# Patient Record
Sex: MALE | Employment: STUDENT | URBAN - METROPOLITAN AREA
[De-identification: names, ages, dates, MRNs, and addresses within clinical notes are randomized per-mention and may not be internally consistent; named-entity substitution may affect disease eponyms.]

---
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
## 2018-02-23 ENCOUNTER — APPOINTMENT (OUTPATIENT)
Dept: GENERAL RADIOLOGY | Age: 18
End: 2018-02-23
Attending: NURSE PRACTITIONER
Payer: COMMERCIAL

## 2018-02-23 ENCOUNTER — TELEPHONE (OUTPATIENT)
Dept: FAMILY MEDICINE CLINIC | Facility: CLINIC | Age: 18
End: 2018-02-23

## 2018-02-23 ENCOUNTER — HOSPITAL ENCOUNTER (OUTPATIENT)
Age: 18
Discharge: HOME | End: 2018-02-23
Payer: COMMERCIAL

## 2018-02-23 VITALS
DIASTOLIC BLOOD PRESSURE: 80 MMHG | HEART RATE: 98 BPM | OXYGEN SATURATION: 98 % | RESPIRATION RATE: 20 BRPM | TEMPERATURE: 99 F | SYSTOLIC BLOOD PRESSURE: 122 MMHG

## 2018-02-23 DIAGNOSIS — J20.9 ACUTE BRONCHITIS, UNSPECIFIED ORGANISM: ICD-10-CM

## 2018-02-23 DIAGNOSIS — J11.1 INFLUENZA-LIKE ILLNESS: Primary | ICD-10-CM

## 2018-02-23 PROCEDURE — 71046 X-RAY EXAM CHEST 2 VIEWS: CPT | Performed by: NURSE PRACTITIONER

## 2018-02-23 PROCEDURE — 99214 OFFICE O/P EST MOD 30 MIN: CPT

## 2018-02-23 PROCEDURE — 99213 OFFICE O/P EST LOW 20 MIN: CPT

## 2018-02-23 RX ORDER — ALBUTEROL SULFATE 90 UG/1
1 AEROSOL, METERED RESPIRATORY (INHALATION) EVERY 4 HOURS PRN
Qty: 1 INHALER | Refills: 0 | Status: SHIPPED | OUTPATIENT
Start: 2018-02-23 | End: 2019-08-23

## 2018-02-23 RX ORDER — PREDNISONE 20 MG/1
40 TABLET ORAL DAILY
Qty: 10 TABLET | Refills: 0 | Status: SHIPPED | OUTPATIENT
Start: 2018-02-23 | End: 2018-02-28

## 2018-02-23 RX ORDER — OSELTAMIVIR PHOSPHATE 75 MG/1
75 CAPSULE ORAL 2 TIMES DAILY
Qty: 10 CAPSULE | Refills: 0 | Status: SHIPPED | OUTPATIENT
Start: 2018-02-23 | End: 2018-02-28

## 2018-02-23 NOTE — TELEPHONE ENCOUNTER
Patients mom called. Pt has flu like symptoms complaining of chest hurting and shortness of breath. Appt is jayshree at 1130 if ok. Please advise mother of same.

## 2018-02-23 NOTE — ED PROVIDER NOTES
Patient Seen in: [address] [facility]    History   Patient presents with:  Cough/URI  Fever (infectious)    Stated Complaint: cough/fever/congestion    66-year-old male presents today with complaints of body aches, fever chills congestion and and time. He appears well-developed and well-nourished. He is cooperative. He appears ill. No distress. HENT:   Head: Normocephalic.    Right Ear: Hearing and tympanic membrane normal.   Left Ear: Hearing and tympanic membrane normal.   Nose: Mucosal marta and history of wheezing. Follow primary MD in 5-7 days symptoms not improved. Encouraged to push fluids and rest.  May use over-the-counter ibuprofen Tylenol for pain fever and antihistamine for congestion.           Disposition and Plan     Clinical Impr

## 2019-08-23 ENCOUNTER — HOSPITAL ENCOUNTER (EMERGENCY)
Age: 19
Discharge: HOME | End: 2019-08-23
Attending: EMERGENCY MEDICINE
Payer: COMMERCIAL

## 2019-08-23 VITALS
TEMPERATURE: 98 F | RESPIRATION RATE: 16 BRPM | OXYGEN SATURATION: 100 % | WEIGHT: 250 LBS | BODY MASS INDEX: 33.13 KG/M2 | HEIGHT: 73 IN | SYSTOLIC BLOOD PRESSURE: 125 MMHG | DIASTOLIC BLOOD PRESSURE: 74 MMHG | HEART RATE: 86 BPM

## 2019-08-23 DIAGNOSIS — L05.01 PILONIDAL ABSCESS: Primary | ICD-10-CM

## 2019-08-23 PROCEDURE — 99283 EMERGENCY DEPT VISIT LOW MDM: CPT

## 2019-08-23 RX ORDER — CLINDAMYCIN HYDROCHLORIDE 300 MG/1
300 CAPSULE ORAL 3 TIMES DAILY
Qty: 21 CAPSULE | Refills: 0 | Status: SHIPPED | OUTPATIENT
Start: 2019-08-23 | End: 2019-08-30

## 2019-08-23 NOTE — ED PROVIDER NOTES
[facility]: [facility] [place]    History   Patient presents with:  Abscess (integumentary)    Stated Complaint: ABSCESS    HPI    Here with his mom concerned about potential pilonidal abscess. Brother had the same.   Has had Abdominal: Soft. Bowel sounds are normal.   Neurological: Pt is alert and oriented to person, place, and time. Pt has normal reflexes. Skin: Skin is warm and dry.       Just above the gluteal cleft there is an area of induration with several areas that

## 2022-06-28 ENCOUNTER — OFFICE VISIT (OUTPATIENT)
Dept: FAMILY MEDICINE CLINIC | Facility: CLINIC | Age: 22
End: 2022-06-28
Payer: COMMERCIAL

## 2022-06-28 VITALS
BODY MASS INDEX: 32.77 KG/M2 | HEART RATE: 96 BPM | RESPIRATION RATE: 16 BRPM | SYSTOLIC BLOOD PRESSURE: 122 MMHG | DIASTOLIC BLOOD PRESSURE: 60 MMHG | HEIGHT: 73 IN | OXYGEN SATURATION: 98 % | TEMPERATURE: 98 F | WEIGHT: 247.25 LBS

## 2022-06-28 DIAGNOSIS — R10.11 RUQ PAIN: ICD-10-CM

## 2022-06-28 DIAGNOSIS — Z00.00 ANNUAL PHYSICAL EXAM: Primary | ICD-10-CM

## 2022-06-28 DIAGNOSIS — Z13.31 NEGATIVE DEPRESSION SCREENING: ICD-10-CM

## 2022-06-28 PROCEDURE — 3074F SYST BP LT 130 MM HG: CPT | Performed by: FAMILY MEDICINE

## 2022-06-28 PROCEDURE — 3008F BODY MASS INDEX DOCD: CPT | Performed by: FAMILY MEDICINE

## 2022-06-28 PROCEDURE — 3078F DIAST BP <80 MM HG: CPT | Performed by: FAMILY MEDICINE

## 2022-06-28 PROCEDURE — 99385 PREV VISIT NEW AGE 18-39: CPT | Performed by: FAMILY MEDICINE

## 2022-06-28 PROCEDURE — 99213 OFFICE O/P EST LOW 20 MIN: CPT | Performed by: FAMILY MEDICINE

## 2022-06-29 LAB
ABSOLUTE BASOPHILS: 87 CELLS/UL (ref 0–200)
ABSOLUTE EOSINOPHILS: 78 CELLS/UL (ref 15–500)
ABSOLUTE LYMPHOCYTES: 1436 CELLS/UL (ref 850–3900)
ABSOLUTE MONOCYTES: 757 CELLS/UL (ref 200–950)
ABSOLUTE NEUTROPHILS: 7343 CELLS/UL (ref 1500–7800)
ALBUMIN/GLOBULIN RATIO: 2 (CALC) (ref 1–2.5)
ALBUMIN: 5.1 G/DL (ref 3.6–5.1)
ALKALINE PHOSPHATASE: 72 U/L (ref 36–130)
ALT: 15 U/L (ref 9–46)
AST: 15 U/L (ref 10–40)
BASOPHILS: 0.9 %
BILIRUBIN, TOTAL: 0.5 MG/DL (ref 0.2–1.2)
BUN: 7 MG/DL (ref 7–25)
CALCIUM: 10.2 MG/DL (ref 8.6–10.3)
CARBON DIOXIDE: 28 MMOL/L (ref 20–32)
CHLORIDE: 102 MMOL/L (ref 98–110)
CHOL/HDLC RATIO: 2.4 (CALC)
CHOLESTEROL, TOTAL: 153 MG/DL
CREATININE: 1.01 MG/DL (ref 0.6–1.35)
EGFR IF AFRICN AM: 121 ML/MIN/1.73M2
EGFR IF NONAFRICN AM: 104 ML/MIN/1.73M2
EOSINOPHILS: 0.8 %
GLOBULIN: 2.6 G/DL (CALC) (ref 1.9–3.7)
GLUCOSE: 84 MG/DL (ref 65–139)
HDL CHOLESTEROL: 65 MG/DL
HEMATOCRIT: 46.7 % (ref 38.5–50)
HEMOGLOBIN: 16.1 G/DL (ref 13.2–17.1)
LDL-CHOLESTEROL: 75 MG/DL (CALC)
LYMPHOCYTES: 14.8 %
MCH: 29.7 PG (ref 27–33)
MCHC: 34.5 G/DL (ref 32–36)
MCV: 86.2 FL (ref 80–100)
MONOCYTES: 7.8 %
MPV: 11.7 FL (ref 7.5–12.5)
NEUTROPHILS: 75.7 %
NON-HDL CHOLESTEROL: 88 MG/DL (CALC)
PLATELET COUNT: 227 THOUSAND/UL (ref 140–400)
POTASSIUM: 4 MMOL/L (ref 3.5–5.3)
PROTEIN, TOTAL: 7.7 G/DL (ref 6.1–8.1)
RDW: 12.7 % (ref 11–15)
RED BLOOD CELL COUNT: 5.42 MILLION/UL (ref 4.2–5.8)
SODIUM: 139 MMOL/L (ref 135–146)
TRIGLYCERIDES: 53 MG/DL
TSH W/REFLEX TO FT4: 2.4 MIU/L (ref 0.4–4.5)
WHITE BLOOD CELL COUNT: 9.7 THOUSAND/UL (ref 3.8–10.8)

## 2022-06-30 ENCOUNTER — HOSPITAL ENCOUNTER (EMERGENCY)
Facility: HOSPITAL | Age: 22
Discharge: LEFT BEFORE DISCHARGE | End: 2022-06-30
Payer: COMMERCIAL

## 2022-07-02 ENCOUNTER — PATIENT MESSAGE (OUTPATIENT)
Dept: FAMILY MEDICINE CLINIC | Facility: CLINIC | Age: 22
End: 2022-07-02

## 2022-07-02 DIAGNOSIS — R10.11 RUQ PAIN: Primary | ICD-10-CM

## 2022-07-02 DIAGNOSIS — K80.00 CALCULUS OF GALLBLADDER WITH ACUTE CHOLECYSTITIS WITHOUT OBSTRUCTION: ICD-10-CM

## 2023-01-28 ENCOUNTER — APPOINTMENT (OUTPATIENT)
Dept: GENERAL RADIOLOGY | Age: 23
End: 2023-01-28
Attending: NURSE PRACTITIONER
Payer: COMMERCIAL

## 2023-01-28 ENCOUNTER — HOSPITAL ENCOUNTER (OUTPATIENT)
Age: 23
Discharge: HOME | End: 2023-01-28
Payer: COMMERCIAL

## 2023-01-28 VITALS
BODY MASS INDEX: 33.13 KG/M2 | DIASTOLIC BLOOD PRESSURE: 75 MMHG | SYSTOLIC BLOOD PRESSURE: 134 MMHG | HEART RATE: 84 BPM | RESPIRATION RATE: 18 BRPM | OXYGEN SATURATION: 97 % | TEMPERATURE: 98 F | WEIGHT: 250 LBS | HEIGHT: 73 IN

## 2023-01-28 DIAGNOSIS — R05.1 ACUTE COUGH: Primary | ICD-10-CM

## 2023-01-28 PROCEDURE — 99203 OFFICE O/P NEW LOW 30 MIN: CPT | Performed by: NURSE PRACTITIONER

## 2023-01-28 PROCEDURE — 71046 X-RAY EXAM CHEST 2 VIEWS: CPT | Performed by: NURSE PRACTITIONER

## 2023-01-28 NOTE — ED INITIAL ASSESSMENT (HPI)
Pt with c/o cough for the past week. Taking mucinex and ibuprofen.   Pt noticed small amount of blood when he coughed this morning

## 2023-05-04 ENCOUNTER — APPOINTMENT (OUTPATIENT)
Dept: CT IMAGING | Age: 23
End: 2023-05-04
Attending: EMERGENCY MEDICINE
Payer: COMMERCIAL

## 2023-05-04 ENCOUNTER — HOSPITAL ENCOUNTER (EMERGENCY)
Age: 23
Discharge: HOME | End: 2023-05-05
Attending: EMERGENCY MEDICINE
Payer: COMMERCIAL

## 2023-05-04 ENCOUNTER — OFFICE VISIT (OUTPATIENT)
Dept: FAMILY MEDICINE CLINIC | Facility: CLINIC | Age: 23
End: 2023-05-04

## 2023-05-04 ENCOUNTER — APPOINTMENT (OUTPATIENT)
Dept: GENERAL RADIOLOGY | Age: 23
End: 2023-05-04
Payer: COMMERCIAL

## 2023-05-04 VITALS
RESPIRATION RATE: 18 BRPM | TEMPERATURE: 98 F | SYSTOLIC BLOOD PRESSURE: 118 MMHG | BODY MASS INDEX: 31.86 KG/M2 | HEART RATE: 76 BPM | OXYGEN SATURATION: 95 % | DIASTOLIC BLOOD PRESSURE: 78 MMHG | HEIGHT: 73 IN | WEIGHT: 240.38 LBS

## 2023-05-04 DIAGNOSIS — H66.92 ACUTE INFECTION OF LEFT EAR: ICD-10-CM

## 2023-05-04 DIAGNOSIS — R59.1 LYMPHADENOPATHY: Primary | ICD-10-CM

## 2023-05-04 DIAGNOSIS — R22.1 LUMP IN NECK: ICD-10-CM

## 2023-05-04 DIAGNOSIS — Z12.83 SKIN CANCER SCREENING: Primary | ICD-10-CM

## 2023-05-04 LAB
ALBUMIN SERPL-MCNC: 4.4 G/DL (ref 3.4–5)
ALBUMIN/GLOB SERPL: 1.3 {RATIO} (ref 1–2)
ALP LIVER SERPL-CCNC: 82 U/L
ALT SERPL-CCNC: 29 U/L
ANION GAP SERPL CALC-SCNC: 5 MMOL/L (ref 0–18)
AST SERPL-CCNC: 16 U/L (ref 15–37)
BASOPHILS # BLD AUTO: 0.11 X10(3) UL (ref 0–0.2)
BASOPHILS NFR BLD AUTO: 0.8 %
BILIRUB SERPL-MCNC: 0.4 MG/DL (ref 0.1–2)
BUN BLD-MCNC: 12 MG/DL (ref 7–18)
CALCIUM BLD-MCNC: 9.7 MG/DL (ref 8.5–10.1)
CHLORIDE SERPL-SCNC: 110 MMOL/L (ref 98–112)
CO2 SERPL-SCNC: 24 MMOL/L (ref 21–32)
CREAT BLD-MCNC: 1.09 MG/DL
EOSINOPHIL # BLD AUTO: 0.17 X10(3) UL (ref 0–0.7)
EOSINOPHIL NFR BLD AUTO: 1.3 %
ERYTHROCYTE [DISTWIDTH] IN BLOOD BY AUTOMATED COUNT: 12.4 %
GFR SERPLBLD BASED ON 1.73 SQ M-ARVRAT: 97 ML/MIN/1.73M2 (ref 60–?)
GLOBULIN PLAS-MCNC: 3.5 G/DL (ref 2.8–4.4)
GLUCOSE BLD-MCNC: 85 MG/DL (ref 70–99)
HCT VFR BLD AUTO: 47.8 %
HGB BLD-MCNC: 16.5 G/DL
IMM GRANULOCYTES # BLD AUTO: 0.09 X10(3) UL (ref 0–1)
IMM GRANULOCYTES NFR BLD: 0.7 %
LYMPHOCYTES # BLD AUTO: 2.09 X10(3) UL (ref 1–4)
LYMPHOCYTES NFR BLD AUTO: 15.5 %
MCH RBC QN AUTO: 30.1 PG (ref 26–34)
MCHC RBC AUTO-ENTMCNC: 34.5 G/DL (ref 31–37)
MCV RBC AUTO: 87.1 FL
MONOCYTES # BLD AUTO: 1.04 X10(3) UL (ref 0.1–1)
MONOCYTES NFR BLD AUTO: 7.7 %
NEUTROPHILS # BLD AUTO: 10.02 X10 (3) UL (ref 1.5–7.7)
NEUTROPHILS # BLD AUTO: 10.02 X10(3) UL (ref 1.5–7.7)
NEUTROPHILS NFR BLD AUTO: 74 %
OSMOLALITY SERPL CALC.SUM OF ELEC: 287 MOSM/KG (ref 275–295)
PLATELET # BLD AUTO: 246 10(3)UL (ref 150–450)
POTASSIUM SERPL-SCNC: 3.6 MMOL/L (ref 3.5–5.1)
PROT SERPL-MCNC: 7.9 G/DL (ref 6.4–8.2)
RBC # BLD AUTO: 5.49 X10(6)UL
SODIUM SERPL-SCNC: 139 MMOL/L (ref 136–145)
TROPONIN I HIGH SENSITIVITY: 3 NG/L
WBC # BLD AUTO: 13.5 X10(3) UL (ref 4–11)

## 2023-05-04 PROCEDURE — 3074F SYST BP LT 130 MM HG: CPT | Performed by: PHYSICIAN ASSISTANT

## 2023-05-04 PROCEDURE — 99203 OFFICE O/P NEW LOW 30 MIN: CPT | Performed by: PHYSICIAN ASSISTANT

## 2023-05-04 PROCEDURE — 99285 EMERGENCY DEPT VISIT HI MDM: CPT

## 2023-05-04 PROCEDURE — 99284 EMERGENCY DEPT VISIT MOD MDM: CPT

## 2023-05-04 PROCEDURE — 85025 COMPLETE CBC W/AUTO DIFF WBC: CPT

## 2023-05-04 PROCEDURE — 3008F BODY MASS INDEX DOCD: CPT | Performed by: PHYSICIAN ASSISTANT

## 2023-05-04 PROCEDURE — 36415 COLL VENOUS BLD VENIPUNCTURE: CPT

## 2023-05-04 PROCEDURE — 93005 ELECTROCARDIOGRAM TRACING: CPT

## 2023-05-04 PROCEDURE — 80053 COMPREHEN METABOLIC PANEL: CPT

## 2023-05-04 PROCEDURE — 80053 COMPREHEN METABOLIC PANEL: CPT | Performed by: EMERGENCY MEDICINE

## 2023-05-04 PROCEDURE — 93010 ELECTROCARDIOGRAM REPORT: CPT

## 2023-05-04 PROCEDURE — 70491 CT SOFT TISSUE NECK W/DYE: CPT | Performed by: EMERGENCY MEDICINE

## 2023-05-04 PROCEDURE — 84484 ASSAY OF TROPONIN QUANT: CPT | Performed by: EMERGENCY MEDICINE

## 2023-05-04 PROCEDURE — 84484 ASSAY OF TROPONIN QUANT: CPT

## 2023-05-04 PROCEDURE — 85025 COMPLETE CBC W/AUTO DIFF WBC: CPT | Performed by: EMERGENCY MEDICINE

## 2023-05-04 PROCEDURE — 71045 X-RAY EXAM CHEST 1 VIEW: CPT | Performed by: EMERGENCY MEDICINE

## 2023-05-04 PROCEDURE — 3078F DIAST BP <80 MM HG: CPT | Performed by: PHYSICIAN ASSISTANT

## 2023-05-04 RX ORDER — LOSARTAN POTASSIUM 25 MG/1
25 TABLET ORAL DAILY
COMMUNITY
End: 2023-05-04

## 2023-05-04 RX ORDER — ASPIRIN 81 MG/1
81 TABLET ORAL DAILY
COMMUNITY
End: 2023-05-04

## 2023-05-04 RX ORDER — AZITHROMYCIN 250 MG/1
TABLET, FILM COATED ORAL
Qty: 6 TABLET | Refills: 0 | Status: SHIPPED | OUTPATIENT
Start: 2023-05-04 | End: 2023-05-09

## 2023-05-04 NOTE — PROGRESS NOTES
Subjective:   Patient ID: Waneta Favre is a 23year old male. HPI   Patient presents to discuss a couple concerns:    He has a lump in the left neck that he found yesterday  It is extremely painful  The pain is constant but the severity is fluctuating  Not very tender if he presses the area or if he swallows  Feels like something is caught in the throat  No fever, chills but he feels hot  No constitutional symptoms  No ear pain, congestion or URI symptoms otherwise  Has had enlarged lymph nodes before but never felt like this, was never this painful    Also concerned about a few skin lesions he has  One is on the left upper arm and appeared after a bad sunburn  Also has one on either leg, the lesion on the left leg is hyperpigmented and raised  Mom has h/o skin cancer  Open to dermatology evaluation    History/Other:   Review of Systems   Constitutional: Negative for chills, fatigue and fever. HENT: Negative for congestion, ear pain, rhinorrhea and sore throat. Eyes: Negative for visual disturbance. Respiratory: Negative for cough, shortness of breath and wheezing. Cardiovascular: Negative for chest pain, palpitations and leg swelling. Gastrointestinal: Negative for abdominal pain, diarrhea, nausea and vomiting. Genitourinary: Negative for dysuria, frequency and hematuria. Musculoskeletal: Negative for arthralgias, gait problem and myalgias. Skin: Negative for color change, rash and wound. Mass left neck - painful  Multiple atypical nevi and other skin lesions   Neurological: Negative for weakness, light-headedness and headaches. Hematological: Positive for adenopathy (left neck). Psychiatric/Behavioral: Negative for dysphoric mood. The patient is not nervous/anxious. No current outpatient medications on file. Allergies:  Augmentin [Amoxicil*        Comment:Hives    Objective:   Physical Exam  Vitals and nursing note reviewed.    Constitutional:       General: He is not in acute distress. Appearance: Normal appearance. He is well-developed. HENT:      Head: Normocephalic and atraumatic. Right Ear: Tympanic membrane and external ear normal.      Left Ear: External ear normal. Tympanic membrane is erythematous. Nose: Mucosal edema and rhinorrhea present. Right Turbinates: Pale. Left Turbinates: Pale. Mouth/Throat:      Mouth: Mucous membranes are moist.      Pharynx: Posterior oropharyngeal erythema present. Comments: Post nasal drip  Eyes:      Conjunctiva/sclera: Conjunctivae normal.      Pupils: Pupils are equal, round, and reactive to light. Neck:      Thyroid: Thyroid mass (left anterior neck fullness/swelling) present. Cardiovascular:      Rate and Rhythm: Normal rate and regular rhythm. Heart sounds: Normal heart sounds. Pulmonary:      Effort: Pulmonary effort is normal.      Breath sounds: Normal breath sounds. No wheezing or rales. Musculoskeletal:      Cervical back: Normal range of motion and neck supple. Lymphadenopathy:      Cervical: Cervical adenopathy present. Skin:     General: Skin is warm. Findings: Lesion (scattered flat erythematous macular lesions with smudged borders. Few hyperpigmented raised nevi. ) present. Neurological:      Mental Status: He is alert. Assessment & Plan:   1. Skin cancer screening  Mom has h/o skin cancer and patient has multiple atypical nevi and scattered erythematous macular skin lesions. Referral to derm for head to toe exam.   - DERM - INTERNAL    2. Lump in neck  Unclear etiology. Given the acute ear infection this could be a reactive lymph node, however does not have the texture nor palpates like a lymph node. The mass is soft and larger than a normal lymph node. thyroid feels normal. Check ultrasound to evaluate further.   - US HEAD/NECK (NPA=19171); Future    3. Acute infection of left ear  Empirically tx with zpack as directed.  Also recommend NSAID prn and plenty of fluids. Follow up if not soon better. - azithromycin 250 MG Oral Tab; Take 2 tablets (500 mg total) by mouth daily for 1 day, THEN 1 tablet (250 mg total) daily for 4 days. Dispense: 6 tablet;  Refill: 0

## 2023-05-05 VITALS
OXYGEN SATURATION: 96 % | TEMPERATURE: 98 F | WEIGHT: 240.31 LBS | RESPIRATION RATE: 20 BRPM | HEIGHT: 73 IN | DIASTOLIC BLOOD PRESSURE: 70 MMHG | SYSTOLIC BLOOD PRESSURE: 132 MMHG | HEART RATE: 79 BPM | BODY MASS INDEX: 31.85 KG/M2

## 2023-05-05 LAB
ATRIAL RATE: 73 BPM
P AXIS: 53 DEGREES
P-R INTERVAL: 164 MS
Q-T INTERVAL: 384 MS
QRS DURATION: 94 MS
QTC CALCULATION (BEZET): 423 MS
R AXIS: 54 DEGREES
T AXIS: 56 DEGREES
VENTRICULAR RATE: 73 BPM

## 2023-05-05 RX ORDER — NAPROXEN 500 MG/1
500 TABLET ORAL 2 TIMES DAILY PRN
Qty: 20 TABLET | Refills: 0 | Status: SHIPPED | OUTPATIENT
Start: 2023-05-05 | End: 2023-05-12

## 2023-05-05 NOTE — ED INITIAL ASSESSMENT (HPI)
Chest pain the last 2 days with DARNELL. Patient saw PMD and wants an US of neck but has to make an appointment. Patient first described pain in neck. Patient also has a lump in left neck.

## 2023-05-20 ENCOUNTER — HOSPITAL ENCOUNTER (OUTPATIENT)
Age: 23
Discharge: HOME | End: 2023-05-20
Attending: EMERGENCY MEDICINE
Payer: COMMERCIAL

## 2023-05-20 VITALS
DIASTOLIC BLOOD PRESSURE: 77 MMHG | BODY MASS INDEX: 31.81 KG/M2 | RESPIRATION RATE: 16 BRPM | OXYGEN SATURATION: 96 % | HEIGHT: 73 IN | WEIGHT: 240 LBS | TEMPERATURE: 98 F | SYSTOLIC BLOOD PRESSURE: 123 MMHG | HEART RATE: 66 BPM

## 2023-05-20 DIAGNOSIS — R59.1 LYMPHADENOPATHY: Primary | ICD-10-CM

## 2023-05-20 PROCEDURE — 99213 OFFICE O/P EST LOW 20 MIN: CPT

## 2023-05-20 RX ORDER — METHYLPREDNISOLONE 4 MG/1
TABLET ORAL
Qty: 1 EACH | Refills: 0 | Status: SHIPPED | OUTPATIENT
Start: 2023-05-20

## 2023-05-20 RX ORDER — DEXAMETHASONE SODIUM PHOSPHATE 4 MG/ML
10 INJECTION, SOLUTION INTRA-ARTICULAR; INTRALESIONAL; INTRAMUSCULAR; INTRAVENOUS; SOFT TISSUE ONCE
Status: COMPLETED | OUTPATIENT
Start: 2023-05-20 | End: 2023-05-20

## 2023-05-20 NOTE — DISCHARGE INSTRUCTIONS
Follow up with DR. Julian  Take medrol dose pack as directed starting tomorrow  If you neck pain persists, follow up with ENT

## 2023-05-20 NOTE — ED INITIAL ASSESSMENT (HPI)
Patient reports neck pain for about 3 weeks. Patient reports he has had a CT of his neck and he it is not getting any better.

## 2023-05-22 ENCOUNTER — HOSPITAL ENCOUNTER (OUTPATIENT)
Dept: ULTRASOUND IMAGING | Age: 23
Discharge: HOME | End: 2023-05-22
Attending: PHYSICIAN ASSISTANT
Payer: COMMERCIAL

## 2023-05-22 DIAGNOSIS — R22.1 LUMP IN NECK: ICD-10-CM

## 2023-05-22 PROCEDURE — 76536 US EXAM OF HEAD AND NECK: CPT | Performed by: PHYSICIAN ASSISTANT

## 2023-05-24 ENCOUNTER — APPOINTMENT (OUTPATIENT)
Dept: CT IMAGING | Age: 23
End: 2023-05-24
Attending: EMERGENCY MEDICINE
Payer: COMMERCIAL

## 2023-05-24 ENCOUNTER — HOSPITAL ENCOUNTER (EMERGENCY)
Age: 23
Discharge: HOME | End: 2023-05-24
Attending: EMERGENCY MEDICINE
Payer: COMMERCIAL

## 2023-05-24 VITALS
DIASTOLIC BLOOD PRESSURE: 80 MMHG | BODY MASS INDEX: 31.14 KG/M2 | SYSTOLIC BLOOD PRESSURE: 124 MMHG | TEMPERATURE: 98 F | HEIGHT: 73 IN | WEIGHT: 235 LBS | RESPIRATION RATE: 16 BRPM | OXYGEN SATURATION: 98 % | HEART RATE: 64 BPM

## 2023-05-24 DIAGNOSIS — R07.9 CHEST PAIN OF UNCERTAIN ETIOLOGY: ICD-10-CM

## 2023-05-24 DIAGNOSIS — R59.1 LYMPHADENOPATHY: Primary | ICD-10-CM

## 2023-05-24 LAB
ALBUMIN SERPL-MCNC: 4 G/DL (ref 3.4–5)
ALBUMIN/GLOB SERPL: 1.1 {RATIO} (ref 1–2)
ALP LIVER SERPL-CCNC: 78 U/L
ALT SERPL-CCNC: 29 U/L
ANION GAP SERPL CALC-SCNC: 3 MMOL/L (ref 0–18)
AST SERPL-CCNC: 15 U/L (ref 15–37)
BASOPHILS # BLD AUTO: 0.08 X10(3) UL (ref 0–0.2)
BASOPHILS NFR BLD AUTO: 0.7 %
BILIRUB SERPL-MCNC: 0.5 MG/DL (ref 0.1–2)
BUN BLD-MCNC: 15 MG/DL (ref 7–18)
CALCIUM BLD-MCNC: 9.1 MG/DL (ref 8.5–10.1)
CHLORIDE SERPL-SCNC: 110 MMOL/L (ref 98–112)
CO2 SERPL-SCNC: 25 MMOL/L (ref 21–32)
CREAT BLD-MCNC: 0.92 MG/DL
EOSINOPHIL # BLD AUTO: 0.01 X10(3) UL (ref 0–0.7)
EOSINOPHIL NFR BLD AUTO: 0.1 %
ERYTHROCYTE [DISTWIDTH] IN BLOOD BY AUTOMATED COUNT: 12.3 %
GFR SERPLBLD BASED ON 1.73 SQ M-ARVRAT: 119 ML/MIN/1.73M2 (ref 60–?)
GLOBULIN PLAS-MCNC: 3.5 G/DL (ref 2.8–4.4)
GLUCOSE BLD-MCNC: 108 MG/DL (ref 70–99)
HCT VFR BLD AUTO: 47.9 %
HGB BLD-MCNC: 16.2 G/DL
IMM GRANULOCYTES # BLD AUTO: 0.12 X10(3) UL (ref 0–1)
IMM GRANULOCYTES NFR BLD: 1 %
LYMPHOCYTES # BLD AUTO: 1.28 X10(3) UL (ref 1–4)
LYMPHOCYTES NFR BLD AUTO: 10.4 %
MCH RBC QN AUTO: 29.1 PG (ref 26–34)
MCHC RBC AUTO-ENTMCNC: 33.8 G/DL (ref 31–37)
MCV RBC AUTO: 86 FL
MONOCYTES # BLD AUTO: 0.58 X10(3) UL (ref 0.1–1)
MONOCYTES NFR BLD AUTO: 4.7 %
NEUTROPHILS # BLD AUTO: 10.18 X10 (3) UL (ref 1.5–7.7)
NEUTROPHILS # BLD AUTO: 10.18 X10(3) UL (ref 1.5–7.7)
NEUTROPHILS NFR BLD AUTO: 83.1 %
OSMOLALITY SERPL CALC.SUM OF ELEC: 287 MOSM/KG (ref 275–295)
PLATELET # BLD AUTO: 240 10(3)UL (ref 150–450)
POTASSIUM SERPL-SCNC: 3.9 MMOL/L (ref 3.5–5.1)
PROT SERPL-MCNC: 7.5 G/DL (ref 6.4–8.2)
RBC # BLD AUTO: 5.57 X10(6)UL
SODIUM SERPL-SCNC: 138 MMOL/L (ref 136–145)
TROPONIN I HIGH SENSITIVITY: <3 NG/L
WBC # BLD AUTO: 12.3 X10(3) UL (ref 4–11)

## 2023-05-24 PROCEDURE — 93005 ELECTROCARDIOGRAM TRACING: CPT

## 2023-05-24 PROCEDURE — 36415 COLL VENOUS BLD VENIPUNCTURE: CPT

## 2023-05-24 PROCEDURE — 84484 ASSAY OF TROPONIN QUANT: CPT | Performed by: EMERGENCY MEDICINE

## 2023-05-24 PROCEDURE — 80053 COMPREHEN METABOLIC PANEL: CPT | Performed by: EMERGENCY MEDICINE

## 2023-05-24 PROCEDURE — 99284 EMERGENCY DEPT VISIT MOD MDM: CPT

## 2023-05-24 PROCEDURE — 99285 EMERGENCY DEPT VISIT HI MDM: CPT

## 2023-05-24 PROCEDURE — 71275 CT ANGIOGRAPHY CHEST: CPT | Performed by: EMERGENCY MEDICINE

## 2023-05-24 PROCEDURE — 93010 ELECTROCARDIOGRAM REPORT: CPT

## 2023-05-24 PROCEDURE — 70491 CT SOFT TISSUE NECK W/DYE: CPT | Performed by: EMERGENCY MEDICINE

## 2023-05-24 PROCEDURE — 85025 COMPLETE CBC W/AUTO DIFF WBC: CPT | Performed by: EMERGENCY MEDICINE

## 2023-05-24 RX ORDER — HYDROCODONE BITARTRATE AND ACETAMINOPHEN 5; 325 MG/1; MG/1
1-2 TABLET ORAL EVERY 6 HOURS PRN
Qty: 20 TABLET | Refills: 0 | Status: SHIPPED | OUTPATIENT
Start: 2023-05-24 | End: 2023-05-29

## 2023-05-25 ENCOUNTER — OFFICE VISIT (OUTPATIENT)
Dept: FAMILY MEDICINE CLINIC | Facility: CLINIC | Age: 23
End: 2023-05-25
Payer: COMMERCIAL

## 2023-05-25 ENCOUNTER — TELEPHONE (OUTPATIENT)
Dept: FAMILY MEDICINE CLINIC | Facility: CLINIC | Age: 23
End: 2023-05-25

## 2023-05-25 VITALS
BODY MASS INDEX: 31.14 KG/M2 | SYSTOLIC BLOOD PRESSURE: 110 MMHG | OXYGEN SATURATION: 99 % | HEIGHT: 73 IN | WEIGHT: 235 LBS | HEART RATE: 72 BPM | RESPIRATION RATE: 18 BRPM | DIASTOLIC BLOOD PRESSURE: 62 MMHG

## 2023-05-25 DIAGNOSIS — R59.1 LYMPHADENOPATHY: Primary | ICD-10-CM

## 2023-05-25 LAB
ATRIAL RATE: 65 BPM
P AXIS: 34 DEGREES
P-R INTERVAL: 146 MS
Q-T INTERVAL: 392 MS
QRS DURATION: 90 MS
QTC CALCULATION (BEZET): 407 MS
R AXIS: 57 DEGREES
T AXIS: 67 DEGREES
VENTRICULAR RATE: 65 BPM

## 2023-05-25 PROCEDURE — 3008F BODY MASS INDEX DOCD: CPT | Performed by: PHYSICIAN ASSISTANT

## 2023-05-25 PROCEDURE — 3078F DIAST BP <80 MM HG: CPT | Performed by: PHYSICIAN ASSISTANT

## 2023-05-25 PROCEDURE — 3074F SYST BP LT 130 MM HG: CPT | Performed by: PHYSICIAN ASSISTANT

## 2023-05-25 PROCEDURE — 99213 OFFICE O/P EST LOW 20 MIN: CPT | Performed by: PHYSICIAN ASSISTANT

## 2023-05-25 RX ORDER — PREDNISONE 20 MG/1
40 TABLET ORAL DAILY
Qty: 10 TABLET | Refills: 0 | Status: SHIPPED | OUTPATIENT
Start: 2023-05-25 | End: 2023-05-30

## 2023-05-25 RX ORDER — LEVOFLOXACIN 500 MG/1
500 TABLET, FILM COATED ORAL DAILY
Qty: 7 TABLET | Refills: 0 | Status: SHIPPED | OUTPATIENT
Start: 2023-05-25

## 2023-05-25 NOTE — ED INITIAL ASSESSMENT (HPI)
pT HAS PAIN TO L SIDIEOF NECK THAT RADIATES DOWN TO CHEST X 1 MONTH. STATES HAS BEEN SEEN X5 FOR THIS PAIN.  PT IS STILL HAVING PAIN

## 2023-05-25 NOTE — TELEPHONE ENCOUNTER
Felicita Luke from [facility] [facility] wants to speak to a nurse regarding the order for a CT scan for patient     Please advise

## 2023-05-25 NOTE — TELEPHONE ENCOUNTER
Spoke with Jolly Pichardo from [facility]. Keagan looking for dx codes for the CT scan ordered. Per che CT has been authorized. She will reach out to the patient to let them know.

## 2023-06-14 ENCOUNTER — OFFICE VISIT (OUTPATIENT)
Facility: LOCATION | Age: 23
End: 2023-06-14
Payer: COMMERCIAL

## 2023-06-14 DIAGNOSIS — I88.9 CERVICAL LYMPHADENITIS: Primary | ICD-10-CM

## 2023-06-14 PROCEDURE — 99204 OFFICE O/P NEW MOD 45 MIN: CPT | Performed by: OTOLARYNGOLOGY

## 2023-08-31 ENCOUNTER — HOSPITAL ENCOUNTER (OUTPATIENT)
Dept: CT IMAGING | Age: 23
Discharge: HOME | End: 2023-08-31
Attending: PHYSICIAN ASSISTANT
Payer: COMMERCIAL

## 2023-08-31 DIAGNOSIS — R22.1 LUMP IN NECK: ICD-10-CM

## 2023-08-31 PROCEDURE — 70491 CT SOFT TISSUE NECK W/DYE: CPT | Performed by: PHYSICIAN ASSISTANT

## 2024-10-29 ENCOUNTER — APPOINTMENT (OUTPATIENT)
Dept: CT IMAGING | Age: 24
End: 2024-10-29
Attending: NURSE PRACTITIONER
Payer: COMMERCIAL

## 2024-10-29 ENCOUNTER — HOSPITAL ENCOUNTER (OUTPATIENT)
Age: 24
Discharge: HOME | End: 2024-10-29
Payer: COMMERCIAL

## 2024-10-29 VITALS
BODY MASS INDEX: 33.13 KG/M2 | HEIGHT: 73 IN | WEIGHT: 250 LBS | OXYGEN SATURATION: 97 % | HEART RATE: 74 BPM | DIASTOLIC BLOOD PRESSURE: 69 MMHG | RESPIRATION RATE: 18 BRPM | TEMPERATURE: 98 F | SYSTOLIC BLOOD PRESSURE: 114 MMHG

## 2024-10-29 DIAGNOSIS — A08.4 VIRAL ENTERITIS: Primary | ICD-10-CM

## 2024-10-29 LAB
#MXD IC: 0.7 X10ˆ3/UL (ref 0.1–1)
BUN BLD-MCNC: 8 MG/DL (ref 7–18)
CHLORIDE BLD-SCNC: 105 MMOL/L (ref 98–112)
CO2 BLD-SCNC: 23 MMOL/L (ref 21–32)
CREAT BLD-MCNC: 1.1 MG/DL
EGFRCR SERPLBLD CKD-EPI 2021: 95 ML/MIN/1.73M2 (ref 60–?)
GLUCOSE BLD-MCNC: 93 MG/DL (ref 70–99)
HCT VFR BLD AUTO: 47.1 %
HCT VFR BLD CALC: 47 %
HGB BLD-MCNC: 15.9 G/DL
ISTAT IONIZED CALCIUM FOR CHEM 8: 1.23 MMOL/L (ref 1.12–1.32)
LYMPHOCYTES # BLD AUTO: 1.8 X10ˆ3/UL (ref 1–4)
LYMPHOCYTES NFR BLD AUTO: 20.5 %
MCH RBC QN AUTO: 29.9 PG (ref 26–34)
MCHC RBC AUTO-ENTMCNC: 33.8 G/DL (ref 31–37)
MCV RBC AUTO: 88.5 FL (ref 80–100)
MIXED CELL %: 7.9 %
NEUTROPHILS # BLD AUTO: 6.3 X10ˆ3/UL (ref 1.5–7.7)
NEUTROPHILS NFR BLD AUTO: 71.6 %
PLATELET # BLD AUTO: 206 X10ˆ3/UL (ref 150–450)
POTASSIUM BLD-SCNC: 4.3 MMOL/L (ref 3.6–5.1)
RBC # BLD AUTO: 5.32 X10ˆ6/UL
SODIUM BLD-SCNC: 142 MMOL/L (ref 136–145)
WBC # BLD AUTO: 8.8 X10ˆ3/UL (ref 4–11)

## 2024-10-29 PROCEDURE — 74177 CT ABD & PELVIS W/CONTRAST: CPT | Performed by: NURSE PRACTITIONER

## 2024-10-29 PROCEDURE — 80047 BASIC METABLC PNL IONIZED CA: CPT | Performed by: NURSE PRACTITIONER

## 2024-10-29 PROCEDURE — 85025 COMPLETE CBC W/AUTO DIFF WBC: CPT | Performed by: NURSE PRACTITIONER

## 2024-10-29 PROCEDURE — 99214 OFFICE O/P EST MOD 30 MIN: CPT | Performed by: NURSE PRACTITIONER

## 2024-10-29 RX ORDER — IOHEXOL 350 MG/ML
100 INJECTION, SOLUTION INTRAVENOUS
Status: COMPLETED | OUTPATIENT
Start: 2024-10-29 | End: 2024-10-29

## 2024-10-29 RX ORDER — SODIUM CHLORIDE 9 MG/ML
500 INJECTION, SOLUTION INTRAVENOUS ONCE
Status: COMPLETED | OUTPATIENT
Start: 2024-10-29 | End: 2024-10-29

## 2024-10-29 RX ORDER — DICYCLOMINE HCL 20 MG
20 TABLET ORAL 4 TIMES DAILY PRN
Qty: 30 TABLET | Refills: 0 | Status: SHIPPED | OUTPATIENT
Start: 2024-10-29 | End: 2024-11-28

## 2024-10-29 NOTE — ED PROVIDER NOTES
Patient Seen in: [facility]      History     Chief Complaint   Patient presents with    Eval-G    Abdomen/Flank Pain     Stated Complaint: groin pain    Subjective:   24-year-old male presents today complaints of pain to the left inguinal lower abdomen.  States did have some testicular pain 2 days ago but has since resolved.  Pain mostly to the inguinal area does radiate into the testicles that time.  Denies any urinary symptoms.  No swelling to the testicular area.  Denies any injuries.  No nausea vomiting diarrhea.  Has had normal bowel movements.  Alert orientated x 3 no other symptoms or concerns.  The patient's medication list, past medical history and social history elements as listed in today's nurse's notes were reviewed and agreed (except as otherwise stated in the HPI).  The patient's family history reviewed and determined to be noncontributory to the presenting problem              Objective:     Past Medical History:    Allergic rhinitis    Pneumonia    Tic disorder    Verruca    left index finger              Past Surgical History:   Procedure Laterality Date    Other surgical history      labrum repair    Shoulder arthroscopy Right 2013    second in 2014                Social History     Socioeconomic History    Marital status: Single    Number of children: 0   Occupational History    Occupation:       Comment: works from home    Tobacco Use    Smoking status: Every Day    Smokeless tobacco: Never    Tobacco comments:     less than 5 cigs a day/vapes daily   Vaping Use    Vaping status: Every Day   Substance and Sexual Activity    Alcohol use: No     Comment: occasionally    Drug use: Yes     Types: Cannabis     Comment: occasionally   Other Topics Concern    Caffeine Concern No    Exercise Yes    Seat Belt Yes   Social History Narrative    Lives in a smoking household, has RAD     Social Drivers of Health      [facility]    Social Connections    Received from [facility], [facility]    Housing Stability              Review of Systems    Positive for stated complaint: groin pain  Other systems are as noted in HPI.  Constitutional and vital signs reviewed.      All other systems reviewed and negative except as noted above.    Physical Exam     ED Triage Vitals   BP 10/29/24 1207 121/86   Pulse 10/29/24 1207 85   Resp 10/29/24 1207 18   Temp 10/29/24 1207 98.3 °F (36.8 °C)   Temp src 10/29/24 1207 Temporal   SpO2 10/29/24 1207 98 %   O2 Device 10/29/24 1205 None (Room air)       Current Vitals:   Vital Signs  BP: 114/69  Pulse: 74  Resp: 18  Temp: 98.3 °F (36.8 °C)  Temp src: Temporal    Oxygen Therapy  SpO2: 97 %  O2 Device: None (Room air)        Physical Exam  Vitals and nursing note reviewed.   Constitutional:       General: He is not in acute distress.     Appearance: He is well-developed. He is not ill-appearing.   HENT:      Head: Normocephalic.      Mouth/Throat:      Mouth: Mucous membranes are moist.   Cardiovascular:      Rate and Rhythm: Normal rate.   Pulmonary:      Effort: Pulmonary effort is normal.      Breath sounds: Normal breath sounds.   Abdominal:      General: Abdomen is flat. Bowel sounds are normal.      Palpations: Abdomen is soft.      Tenderness: There is abdominal tenderness in the suprapubic area and left lower quadrant.      Comments: Mild pain to the inguinal area no mass noted.   Genitourinary:     Testes:         Right: Mass, tenderness or swelling not present.         Left: Mass, tenderness or swelling not present.   Skin:     General: Skin is warm and dry.   Neurological:      Mental Status: He is alert and oriented to person, place, and time.             ED Course     Labs Reviewed   POCT ISTAT CHEM8 CARTRIDGE - Normal   POCT CBC              CT ABDOMEN+PELVIS(CONTRAST ONLY)(CPT=74177)    Result Date: 10/29/2024  PROCEDURE:  CT ABDOMEN+PELVIS (CONTRAST  ONLY) (CPT=74177)  COMPARISON:  None.  INDICATIONS:  groin pain  TECHNIQUE:  CT scanning was performed from the dome of the diaphragm to the pubic symphysis with non-ionic intravenous contrast material. Post contrast coronal MPR imaging was performed.  Dose reduction techniques were used. Dose information is transmitted to the ACR (American College of Radiology) NRDR (National Radiology Data Registry) which includes the Dose Index Registry.  PATIENT STATED HISTORY:(As transcribed by Technologist)  Patient states he has had LLQ pain for the past 4 days without other symptoms.   CONTRAST USED:  100cc of Omnipaque 350  FINDINGS:  LIVER:  No enlargement, atrophy, abnormal density, or significant focal lesion.  BILIARY:  No visible dilatation or calcification.  PANCREAS:  No lesion, fluid collection, ductal dilatation, or atrophy.  SPLEEN:  No enlargement or focal lesion.  KIDNEYS:  Punctate nonobstructing left inferior renal calcification.  No mass.  ADRENALS:  No mass or enlargement.  AORTA/VASCULAR:  No aneurysm or dissection.  RETROPERITONEUM:  No mass or adenopathy.  BOWEL/MESENTERY:  Normal caliber small and large bowel.  There is bowel wall thickening involving the duodenum and several jejunal loops can be seen in enteritis.  In addition, bowel wall thickening extends from the splenic flexure to the level of the rectum may be due to lack of adequate distension however colitis cannot be excluded.  No free fluid.  ABDOMINAL WALL:  Small umbilical fat containing hernia.  URINARY BLADDER:  Bladder is not well distended may account for diffuse bladder wall thickening.  No visible focal wall thickening, lesion, or calculus.  PELVIC NODES:  No adenopathy.  PELVIC ORGANS:  Pelvic organs appropriate for patient age.  BONES:  Bilateral L5 spondylolysis.  LUNG BASES:  No visible pulmonary or pleural disease.             CONCLUSION:  1. Punctate nonobstructing left renal calcification. 2. Bowel wall thickening involves the  duodenum and jejunal loops can be seen in enteritis. 3. Colonic wall thickening extends from the splenic flexure to the level of the rectum may be due to lack of adequate distension, colitis cannot be excluded.   LOCATION:  [facility]   Dictated by (CST): Isabela Celaya MD on 10/29/2024 at 1:33 PM     Finalized by (CST): Isabela Celaya MD on 10/29/2024 at 2:11 PM          [facility]     Please note that this report has been produced using speech recognition software and may contain errors related to that system including, but not limited to, errors in grammar, punctuation, and spelling, as well as words and phrases that possibly may have been recognized inappropriately.  If there are any questions or concerns, contact the dictating provider for clarification.              Medical Decision Making  Differential diagnosis includes but is not limited to: Appendicitis, colitis, diverticulitis, enteritis, obstruction      Presented today with complaints of left lower abdominal and inguinal pain.  Denied having any nausea vomiting or diarrhea.  Normal bowel movements.  No fevers.  Pain at times does radiate into the testicles.  Did have more testicular pain yesterday.  Testicular exam was normal.  IV was established 0.9 normal saline IV fluid bolus was started.  CBC i-STAT both drawn and showed no acute findings.  CT scan and pelvis does show enteritis with colitis.  Do suspect viral cause of this.  Diet restrictions were given.  Encouraged to push fluids.  Will give Bentyl for any abdominal cramping.  To follow-up with his primary care physician in 1 week if symptoms do not prove.  Go directly to the ER with any worsening localized abdominal pain, vomiting, fever, or any worsening of symptoms.  Patient verbalized understanding and agreed to plan of care.  Discussed patient with Dr. Andrews, attending of the IC who agrees with plan of care.    Amount and/or Complexity of Data Reviewed  Labs: ordered. Decision-making details documented in ED  Course.     Details: CBC  I-STAT  Radiology: ordered. Decision-making details documented in ED Course.     Details: CT scan of abdomen pelvis with IV contrast    Risk  OTC drugs.  Prescription drug management.        Disposition and Plan     Clinical Impression:  1. Viral enteritis         Disposition:  Discharge  10/29/2024  2:19 pm    Follow-up:  Roderick Karimi MD  [address] [place] [id number]  [phone]    In 1 week  As needed          Medications Prescribed:  Current Discharge Medication List        START taking these medications    Details   dicyclomine 20 MG Oral Tab Take 1 tablet (20 mg total) by mouth 4 (four) times daily as needed.  Qty: 30 tablet, Refills: 0                 Supplementary Documentation:

## 2024-10-29 NOTE — ED QUICK NOTES
Pt returned from ct and tech reported that pt vomited clear liquid after contrast injection. Pt states he is feeling fine on return. Pt states that he has felt nauseated before when he has received contrast but it was not that bad.

## 2024-11-08 ENCOUNTER — OFFICE VISIT (OUTPATIENT)
Dept: FAMILY MEDICINE CLINIC | Facility: CLINIC | Age: 24
End: 2024-11-08
Payer: COMMERCIAL

## 2024-11-08 VITALS
DIASTOLIC BLOOD PRESSURE: 68 MMHG | WEIGHT: 248 LBS | HEIGHT: 73 IN | RESPIRATION RATE: 16 BRPM | SYSTOLIC BLOOD PRESSURE: 130 MMHG | HEART RATE: 89 BPM | OXYGEN SATURATION: 96 % | BODY MASS INDEX: 32.87 KG/M2

## 2024-11-08 DIAGNOSIS — R10.9 ABDOMINAL PAIN, VOMITING, AND DIARRHEA: Primary | ICD-10-CM

## 2024-11-08 DIAGNOSIS — R19.7 ABDOMINAL PAIN, VOMITING, AND DIARRHEA: Primary | ICD-10-CM

## 2024-11-08 DIAGNOSIS — K52.9 COLITIS: ICD-10-CM

## 2024-11-08 DIAGNOSIS — R11.10 ABDOMINAL PAIN, VOMITING, AND DIARRHEA: Primary | ICD-10-CM

## 2024-11-08 PROCEDURE — 99214 OFFICE O/P EST MOD 30 MIN: CPT | Performed by: PHYSICIAN ASSISTANT

## 2024-11-08 RX ORDER — METRONIDAZOLE 500 MG/1
500 TABLET ORAL
COMMUNITY
Start: 2024-11-01 | End: 2024-11-08

## 2024-11-08 RX ORDER — CIPROFLOXACIN 500 MG/1
500 TABLET, FILM COATED ORAL
COMMUNITY
Start: 2024-11-01 | End: 2024-11-08

## 2024-11-08 RX ORDER — BUDESONIDE 3 MG/1
9 CAPSULE, COATED PELLETS ORAL EVERY MORNING
Qty: 90 CAPSULE | Refills: 0 | Status: SHIPPED | OUTPATIENT
Start: 2024-11-08

## 2024-11-08 NOTE — PROGRESS NOTES
Subjective:   Patient ID: Wilder Lezama is a 24 year old male.    HPI  Patient presents accompanied by his mom for follow up from UC and two ER visits regarding colitis.     First seen 10/29/24 with c/o left lower abdomen pain radiating to testicles  CT abd/pelvis showed duodenal/jejunal bowel wall thickening seen in enteritis as well as throughout the colon.   Normal CBC and chem 8 unremarkable  Viral cause suspected and was discharged home on bentyl    Seen 2 days later in ER with worsening symptoms  Had developed diarrhea at that point  Labs rechecked with mild increase in WBC/neutrophils  Discharged home with zofran and empirically started on cipro/flagyl for colitis  Was also referred to GI    Seen 4 days after that in ER with ongoing symptoms  Stool tests negative for c diff, negative GI panel  WBC returned to normal  Labs showed mildly elevated CRP  US scrotum unremarkable  CT abd/pelvis - 1 mm nonobstructing left renal stone, no abnormal bowel wall thickening or signs of diverticulitis  Patient discharged home with advice to complete medication and follow up with PCP and GI    Since then he has been very slowly improving  Last night after he took his abx he felt abdominal pain and experienced vomiting  Reports his initial abdominal pain had gotten a little better after couple days on meds  Nausea got worse however, likely side effect of flagyl  Now pain is coming back more and more  Pain is generalized but most common of the LLQ and left umbilical area  For a few days very bad pain umbilicus  Also c/o acid reflux and heartburn    Still having a lot of diarrhea  Never eased up with meds  Started 2 weeks ago now  No blood in stool  Watery in texture  Having 5+ episodes/day    Has never had this before  No recent travel   No recent abx use  No hospital visits    Appetite is decreased  He has been trying to stay on bland diet - tried chicken salad, ekaterina cardenas (meat and bread), kristofer crackers  When he eats he  feels like food runs right through him (has been ongoing for a long time)  Eating makes him feel sick    No fevers, sweating  Had slight chills at onset    No family h/o IBD  There is colon cancer in maternal grandfather, maternal uncle (dx age 45)    History/Other:   Review of Systems   Constitutional:  Positive for appetite change, chills and fatigue. Negative for diaphoresis and fever.   HENT:  Negative for congestion, ear pain, postnasal drip, rhinorrhea, sinus pressure and sore throat.    Respiratory:  Negative for cough, shortness of breath and wheezing.    Cardiovascular:  Negative for chest pain and palpitations.   Gastrointestinal:  Positive for abdominal pain, diarrhea, nausea and vomiting. Negative for abdominal distention, anal bleeding, blood in stool, constipation and rectal pain.   Genitourinary:  Negative for dysuria, frequency, hematuria and urgency.   Neurological:  Negative for weakness, light-headedness and headaches.     Current Outpatient Medications   Medication Sig Dispense Refill    ciprofloxacin 500 MG Oral Tab Take 1 tablet (500 mg total) by mouth.      metRONIDAZOLE 500 MG Oral Tab Take 1 tablet (500 mg total) by mouth.      dicyclomine 20 MG Oral Tab Take 1 tablet (20 mg total) by mouth 4 (four) times daily as needed. (Patient not taking: Reported on 11/8/2024) 30 tablet 0     Allergies:Allergies[1]    Objective:   Physical Exam  Vitals and nursing note reviewed.   Constitutional:       Appearance: He is well-developed.   HENT:      Head: Normocephalic and atraumatic.   Eyes:      Conjunctiva/sclera: Conjunctivae normal.      Pupils: Pupils are equal, round, and reactive to light.   Cardiovascular:      Rate and Rhythm: Normal rate and regular rhythm.      Heart sounds: Normal heart sounds.   Pulmonary:      Effort: Pulmonary effort is normal.      Breath sounds: Normal breath sounds. No wheezing or rales.   Abdominal:      General: Bowel sounds are decreased. There is no distension.       Palpations: Abdomen is soft. Abdomen is not rigid. There is no mass.      Tenderness: There is generalized abdominal tenderness. There is guarding. There is no right CVA tenderness, left CVA tenderness or rebound. Negative signs include Castellanos's sign, Rovsing's sign and McBurney's sign.      Hernia: No hernia is present.      Comments: Dull to percussion. TTP with percussion and palpation.   Musculoskeletal:      Cervical back: Normal range of motion and neck supple.   Lymphadenopathy:      Cervical: No cervical adenopathy.   Skin:     General: Skin is warm and dry.   Neurological:      Mental Status: He is alert.         Assessment & Plan:   1. Abdominal pain, vomiting, and diarrhea  Ongoing symptoms of abdominal pain and diarrhea. Labs reviewed, stool studies negative. Symptoms are suspicious for IBD especially in setting of enteritis/colitis on CT. Since he is not improving with abx, bentyl, or zofran, will give a short course of Budesonide while he awaits GI evaluation. Continue to maintain a bland diet and advance as tolerated. Try to stay well hydrated. Contact the office with any worsening symptoms or if something new develops.   - budesonide 3 MG Oral Cap DR Particles; Take 3 capsules (9 mg total) by mouth every morning.  Dispense: 90 capsule; Refill: 0    2. Colitis  As above.   - budesonide 3 MG Oral Cap DR Particles; Take 3 capsules (9 mg total) by mouth every morning.  Dispense: 90 capsule; Refill: 0             [1]   Allergies  Allergen Reactions    Augmentin [Amoxicillin-Pot Clavulanate]      Hives

## 2024-12-07 DIAGNOSIS — R19.7 ABDOMINAL PAIN, VOMITING, AND DIARRHEA: ICD-10-CM

## 2024-12-07 DIAGNOSIS — R11.10 ABDOMINAL PAIN, VOMITING, AND DIARRHEA: ICD-10-CM

## 2024-12-07 DIAGNOSIS — R10.9 ABDOMINAL PAIN, VOMITING, AND DIARRHEA: ICD-10-CM

## 2024-12-07 DIAGNOSIS — K52.9 COLITIS: ICD-10-CM

## 2024-12-09 RX ORDER — BUDESONIDE 3 MG/1
9 CAPSULE, COATED PELLETS ORAL EVERY MORNING
Qty: 90 CAPSULE | Refills: 0 | Status: SHIPPED | OUTPATIENT
Start: 2024-12-09

## 2024-12-09 NOTE — TELEPHONE ENCOUNTER
A refill request was received for:  Requested Prescriptions     Pending Prescriptions Disp Refills    BUDESONIDE 3 MG Oral Cap DR Particles [Pharmacy Med Name: BUDESONIDE EC 3 MG CAPSULE] 90 capsule 0     Sig: TAKE 3 CAPSULES BY MOUTH EVERY MORNING       Last refill date: 11/08/24     Last office visit: 11/08/24      Future Appointments   Date Time Provider Department Center   1/7/2025  8:00 AM Misael Sosa DO [facility] SUB GI   1/7/2025  8:30 AM Misael Sosa DO [facility] SUB GI

## 2025-01-07 PROBLEM — R93.3 ABNORMAL FINDINGS ON DIAGNOSTIC IMAGING OF DIGESTIVE SYSTEM: Status: ACTIVE | Noted: 2025-01-07

## 2025-01-07 PROBLEM — R11.0 NAUSEA: Status: ACTIVE | Noted: 2025-01-07

## 2025-01-07 PROBLEM — R19.7 DIARRHEA, UNSPECIFIED: Status: ACTIVE | Noted: 2025-01-07

## 2025-01-09 DIAGNOSIS — K52.9 COLITIS: ICD-10-CM

## 2025-01-09 DIAGNOSIS — R11.10 ABDOMINAL PAIN, VOMITING, AND DIARRHEA: ICD-10-CM

## 2025-01-09 DIAGNOSIS — R19.7 ABDOMINAL PAIN, VOMITING, AND DIARRHEA: ICD-10-CM

## 2025-01-09 DIAGNOSIS — R10.9 ABDOMINAL PAIN, VOMITING, AND DIARRHEA: ICD-10-CM

## 2025-01-09 RX ORDER — BUDESONIDE 3 MG/1
9 CAPSULE, COATED PELLETS ORAL EVERY MORNING
Qty: 90 CAPSULE | Refills: 0 | Status: SHIPPED | OUTPATIENT
Start: 2025-01-09

## 2025-01-09 NOTE — TELEPHONE ENCOUNTER
A refill request was received for:  Requested Prescriptions     Pending Prescriptions Disp Refills    BUDESONIDE 3 MG Oral Cap DR Particles [Pharmacy Med Name: BUDESONIDE EC 3 MG CAPSULE] 90 capsule 0     Sig: TAKE 3 CAPSULES BY MOUTH EVERY MORNING       Last refill date:12/9/2024       Last office visit: 11/8/2024    Follow up due:  No future appointments.

## 2025-02-25 ENCOUNTER — TELEPHONE (OUTPATIENT)
Age: 25
End: 2025-02-25

## 2025-03-05 ENCOUNTER — HOSPITAL ENCOUNTER (OUTPATIENT)
Dept: MRI IMAGING | Facility: HOSPITAL | Age: 25
Discharge: HOME | End: 2025-03-05
Attending: PHYSICIAN ASSISTANT
Payer: COMMERCIAL

## 2025-03-05 DIAGNOSIS — R44.3 HALLUCINATIONS: ICD-10-CM

## 2025-03-05 DIAGNOSIS — R44.2 PHANTOSMIA: ICD-10-CM

## 2025-03-05 DIAGNOSIS — F41.9 ANXIETY: ICD-10-CM

## 2025-03-05 DIAGNOSIS — G47.9 DIFFICULTY SLEEPING: ICD-10-CM

## 2025-03-05 PROCEDURE — 70551 MRI BRAIN STEM W/O DYE: CPT | Performed by: PHYSICIAN ASSISTANT

## 2025-03-19 DIAGNOSIS — G47.9 DIFFICULTY SLEEPING: ICD-10-CM

## 2025-03-19 RX ORDER — TRAZODONE HYDROCHLORIDE 50 MG/1
50 TABLET ORAL NIGHTLY PRN
Qty: 30 TABLET | Refills: 0 | OUTPATIENT
Start: 2025-03-19

## 2025-04-11 ENCOUNTER — OFFICE VISIT (OUTPATIENT)
Dept: SLEEP CENTER | Age: 25
End: 2025-04-11
Attending: INTERNAL MEDICINE
Payer: COMMERCIAL

## 2025-04-11 PROCEDURE — 95806 SLEEP STUDY UNATT&RESP EFFT: CPT

## 2025-04-16 ENCOUNTER — SLEEP STUDY (OUTPATIENT)
Facility: CLINIC | Age: 25
End: 2025-04-16
Payer: COMMERCIAL

## 2025-04-16 DIAGNOSIS — G47.30 SLEEP APNEA, UNSPECIFIED TYPE: Primary | ICD-10-CM

## 2025-04-16 PROCEDURE — 95806 SLEEP STUDY UNATT&RESP EFFT: CPT | Performed by: INTERNAL MEDICINE

## (undated) NOTE — ED AVS SNAPSHOT
Amy Schroeder   MRN: [id number]    Department:  [facility] Emergency Department in [place]   Date of Visit:  8/23/2019           Disclosure     Insurance plans vary and the physician(s) referred by the ER may not be covered by your plan.  Please contact tell this physician (or your personal doctor if your instructions are to return to your personal doctor) about any new or lasting problems. The primary care or specialist physician will see patients referred from the [facility] Emergency Department.  [place]

## (undated) NOTE — ED AVS SNAPSHOT
[facility] [address] [facility] [address] [address]    Phone:  [phone]    Fax:  [address]   MRN: [id number]    Department:  [facility] in [place]   Date of Visit:  3/31/2017           Diagn nuestro adminstrador de susannah mcdaniel [phone]. Expect to receive an electronic request (by e-mail or text) to complete a self-assessment the day after your visit. You may also receive a call from our patient liason soon after your visit.  Also, some [facility] ([address]) [address]  ([address]) [address] [facility]. ([address]) [address] [place] [address] 30. (8 not sign up before the expiration date, you must request a new code. Your unique [employer]-RF[employer] Access Code: [email or web address]  Expires: 4/25/2017  6:38 PM    If you have questions, you can call [phone] to talk to our [facility] Staff.  Remember, [employer]

## (undated) NOTE — ED AVS SNAPSHOT
[facility] [address] [address]    Phone:  [phone]    Fax:  [address]   MRN: [id number]    Department:  [facility] [place]   Date of Visit:  2/24/2017           Diagn [address] [place] [address] [address]     Phone:  [phone]    - Albuterol Sulfate  (90 Base) MCG/ACT Aers  - clarithromycin 250 MG Tabs  - predniSONE 20 MG Tabs      You can get these medications from any pharmacy     Bring a paper prescription for self-assessment the day after your visit. You may also receive a call from our patient liason soon after your visit. Also, some patients receive a detailed feedback survey mailed to them a week after the visit.   If you receive this, we would really apprec [facility] [address] [place], [place] [address]y 30 (68 [facility] [id number] [address] ([address]) [facility] [address]. 176 [facility]. ([address]) [facility] PATIENT STATED HISTORY:  Cough and congestion for one week. FINDINGS:    LUNGS:  No focal consolidation. Normal vascularity. CARDIAC:  Normal size cardiac silhouette. MEDIASTINUM:  Normal.  PLEURA:  Normal.  No pleural effusions.   BONES:  Normal